# Patient Record
Sex: FEMALE | Race: WHITE | NOT HISPANIC OR LATINO
[De-identification: names, ages, dates, MRNs, and addresses within clinical notes are randomized per-mention and may not be internally consistent; named-entity substitution may affect disease eponyms.]

---

## 2017-03-28 ENCOUNTER — APPOINTMENT (OUTPATIENT)
Dept: RHEUMATOLOGY | Facility: CLINIC | Age: 67
End: 2017-03-28

## 2017-07-03 ENCOUNTER — OUTPATIENT (OUTPATIENT)
Dept: OUTPATIENT SERVICES | Facility: HOSPITAL | Age: 67
LOS: 1 days | Discharge: HOME | End: 2017-07-03
Payer: MEDICARE

## 2017-07-03 ENCOUNTER — RESULT REVIEW (OUTPATIENT)
Age: 67
End: 2017-07-03

## 2017-07-03 VITALS
SYSTOLIC BLOOD PRESSURE: 135 MMHG | HEART RATE: 67 BPM | OXYGEN SATURATION: 100 % | RESPIRATION RATE: 16 BRPM | WEIGHT: 117.95 LBS | HEIGHT: 63 IN | TEMPERATURE: 98 F | DIASTOLIC BLOOD PRESSURE: 63 MMHG

## 2017-07-03 DIAGNOSIS — Z98.890 OTHER SPECIFIED POSTPROCEDURAL STATES: Chronic | ICD-10-CM

## 2017-07-03 PROCEDURE — 88305 TISSUE EXAM BY PATHOLOGIST: CPT | Mod: 26

## 2017-07-03 PROCEDURE — 88312 SPECIAL STAINS GROUP 1: CPT | Mod: 26

## 2017-07-03 RX ORDER — SODIUM CHLORIDE 9 MG/ML
1000 INJECTION INTRAMUSCULAR; INTRAVENOUS; SUBCUTANEOUS
Qty: 0 | Refills: 0 | Status: DISCONTINUED | OUTPATIENT
Start: 2017-07-03 | End: 2017-07-18

## 2017-07-03 RX ORDER — AMITRIPTYLINE HCL 25 MG
1 TABLET ORAL
Qty: 0 | Refills: 0 | COMMUNITY

## 2017-07-03 RX ADMIN — SODIUM CHLORIDE 75 MILLILITER(S): 9 INJECTION INTRAMUSCULAR; INTRAVENOUS; SUBCUTANEOUS at 10:40

## 2017-07-03 NOTE — ASU PATIENT PROFILE, ADULT - PSH
H/O carpal tunnel repair    H/O hernia repair    S/P right knee arthroscopy H/O carpal tunnel repair    H/O hernia repair    History of bunionectomy of left great toe  2013  S/P bunionectomy  right foot 1993  S/P right knee arthroscopy

## 2017-07-06 LAB — SURGICAL PATHOLOGY FINAL REPORT - CH: SIGNIFICANT CHANGE UP

## 2017-07-12 DIAGNOSIS — K44.9 DIAPHRAGMATIC HERNIA WITHOUT OBSTRUCTION OR GANGRENE: ICD-10-CM

## 2017-07-12 DIAGNOSIS — Z88.0 ALLERGY STATUS TO PENICILLIN: ICD-10-CM

## 2017-07-12 DIAGNOSIS — K31.7 POLYP OF STOMACH AND DUODENUM: ICD-10-CM

## 2017-07-12 DIAGNOSIS — I34.1 NONRHEUMATIC MITRAL (VALVE) PROLAPSE: ICD-10-CM

## 2017-07-12 DIAGNOSIS — E78.5 HYPERLIPIDEMIA, UNSPECIFIED: ICD-10-CM

## 2017-11-03 ENCOUNTER — APPOINTMENT (OUTPATIENT)
Dept: OBGYN | Facility: CLINIC | Age: 67
End: 2017-11-03

## 2018-10-26 PROBLEM — E04.9 NONTOXIC GOITER, UNSPECIFIED: Chronic | Status: ACTIVE | Noted: 2017-07-03

## 2018-10-26 PROBLEM — E78.5 HYPERLIPIDEMIA, UNSPECIFIED: Chronic | Status: ACTIVE | Noted: 2017-07-03

## 2018-10-26 PROBLEM — I34.1 NONRHEUMATIC MITRAL (VALVE) PROLAPSE: Chronic | Status: ACTIVE | Noted: 2017-07-03

## 2018-10-26 PROBLEM — K21.9 GASTRO-ESOPHAGEAL REFLUX DISEASE WITHOUT ESOPHAGITIS: Chronic | Status: ACTIVE | Noted: 2017-07-03

## 2018-12-04 ENCOUNTER — APPOINTMENT (OUTPATIENT)
Dept: OBGYN | Facility: CLINIC | Age: 68
End: 2018-12-04
Payer: MEDICARE

## 2018-12-04 VITALS
WEIGHT: 114 LBS | HEIGHT: 63 IN | BODY MASS INDEX: 20.2 KG/M2 | DIASTOLIC BLOOD PRESSURE: 60 MMHG | SYSTOLIC BLOOD PRESSURE: 110 MMHG

## 2018-12-04 PROCEDURE — 99213 OFFICE O/P EST LOW 20 MIN: CPT

## 2018-12-10 ENCOUNTER — APPOINTMENT (OUTPATIENT)
Dept: DERMATOLOGY | Facility: CLINIC | Age: 68
End: 2018-12-10
Payer: MEDICARE

## 2018-12-10 VITALS — BODY MASS INDEX: 21.09 KG/M2 | HEIGHT: 63 IN | WEIGHT: 119 LBS

## 2018-12-10 DIAGNOSIS — Z80.8 FAMILY HISTORY OF MALIGNANT NEOPLASM OF OTHER ORGANS OR SYSTEMS: ICD-10-CM

## 2018-12-10 DIAGNOSIS — Z84.0 FAMILY HISTORY OF DISEASES OF THE SKIN AND SUBCUTANEOUS TISSUE: ICD-10-CM

## 2018-12-10 DIAGNOSIS — Z86.39 PERSONAL HISTORY OF OTHER ENDOCRINE, NUTRITIONAL AND METABOLIC DISEASE: ICD-10-CM

## 2018-12-10 PROCEDURE — 99203 OFFICE O/P NEW LOW 30 MIN: CPT | Mod: 25

## 2018-12-10 PROCEDURE — 11901 INJECT SKIN LESIONS >7: CPT

## 2018-12-10 RX ORDER — HYDROCORTISONE BUTYRATE 1 MG/ML
0.1 SOLUTION TOPICAL TWICE DAILY
Qty: 1 | Refills: 2 | Status: ACTIVE | COMMUNITY
Start: 2018-12-10 | End: 1900-01-01

## 2018-12-10 RX ORDER — BIOTIN 5 MG
5 CAPSULE ORAL
Refills: 0 | Status: ACTIVE | COMMUNITY

## 2019-01-18 ENCOUNTER — APPOINTMENT (OUTPATIENT)
Dept: OBGYN | Facility: CLINIC | Age: 69
End: 2019-01-18
Payer: MEDICARE

## 2019-01-18 VITALS
WEIGHT: 123 LBS | HEIGHT: 63 IN | SYSTOLIC BLOOD PRESSURE: 110 MMHG | DIASTOLIC BLOOD PRESSURE: 60 MMHG | BODY MASS INDEX: 21.79 KG/M2

## 2019-01-18 DIAGNOSIS — I86.3 VULVAL VARICES: ICD-10-CM

## 2019-01-18 DIAGNOSIS — R35.0 FREQUENCY OF MICTURITION: ICD-10-CM

## 2019-01-18 DIAGNOSIS — L29.2 PRURITUS VULVAE: ICD-10-CM

## 2019-01-18 PROCEDURE — 82270 OCCULT BLOOD FECES: CPT

## 2019-01-18 PROCEDURE — G0101: CPT

## 2019-01-18 RX ORDER — HYDROCORTISONE 25 MG/G
2.5 CREAM TOPICAL DAILY
Qty: 1 | Refills: 0 | Status: ACTIVE | COMMUNITY
Start: 2019-01-18 | End: 1900-01-01

## 2019-01-23 ENCOUNTER — APPOINTMENT (OUTPATIENT)
Dept: DERMATOLOGY | Facility: CLINIC | Age: 69
End: 2019-01-23
Payer: MEDICARE

## 2019-01-23 DIAGNOSIS — E04.2 NONTOXIC MULTINODULAR GOITER: ICD-10-CM

## 2019-01-23 DIAGNOSIS — Z92.89 PERSONAL HISTORY OF OTHER MEDICAL TREATMENT: ICD-10-CM

## 2019-01-23 DIAGNOSIS — F41.9 ANXIETY DISORDER, UNSPECIFIED: ICD-10-CM

## 2019-01-23 DIAGNOSIS — R01.1 CARDIAC MURMUR, UNSPECIFIED: ICD-10-CM

## 2019-01-23 DIAGNOSIS — E78.00 PURE HYPERCHOLESTEROLEMIA, UNSPECIFIED: ICD-10-CM

## 2019-01-23 LAB — CYTOLOGY CVX/VAG DOC THIN PREP: NORMAL

## 2019-01-23 PROCEDURE — 99213 OFFICE O/P EST LOW 20 MIN: CPT | Mod: 25

## 2019-01-23 PROCEDURE — 11901 INJECT SKIN LESIONS >7: CPT

## 2019-01-23 NOTE — PHYSICAL EXAM
[Alert] : alert [Oriented x 3] : ~L oriented x 3 [Well Nourished] : well nourished [FreeTextEntry3] : Marked alopecia of the sideburn regions, trace of the frontal hairline in the temporal region.

## 2019-01-23 NOTE — HISTORY OF PRESENT ILLNESS
[FreeTextEntry1] : Hair loss persists. 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  No improvement to date.  using locoid solution and minoxidil/finasteride.  Denies itching or irritation.

## 2019-01-23 NOTE — ASSESSMENT
[FreeTextEntry1] : Continue topicals as doing.\par discussed at length with patient.\par f/u in 1 month.

## 2019-01-27 NOTE — PHYSICAL EXAM
[Awake] : awake [Alert] : alert [Acute Distress] : no acute distress [Mass] : no breast mass [Nipple Discharge] : no nipple discharge [Axillary LAD] : no axillary lymphadenopathy [Soft] : soft [Tender] : non tender [Oriented x3] : oriented to person, place, and time [No Bleeding] : there was no active vaginal bleeding [Uterine Adnexae] : were not tender and not enlarged [Occult Blood] : occult blood test from digital rectal exam was negative [Normal rectal exam] : was normal [Normal Brown Stool] : was normal and brown [Internal Hemorrhoid] : no internal hemorrhoids were present [External Hemorrhoid] : no external hemorrhoids were present [Skin Tags] : no residual hemorrhoidal skin tags [Normal] : was normal [None] : there was no rectal mass

## 2019-01-27 NOTE — CHIEF COMPLAINT
[FreeTextEntry1] : Patient presents for annual GYN examination.  Patient notes urinary frequency and vulvar itching

## 2019-02-13 ENCOUNTER — RESULT REVIEW (OUTPATIENT)
Age: 69
End: 2019-02-13

## 2019-02-20 ENCOUNTER — APPOINTMENT (OUTPATIENT)
Dept: DERMATOLOGY | Facility: CLINIC | Age: 69
End: 2019-02-20
Payer: MEDICARE

## 2019-02-20 VITALS — BODY MASS INDEX: 21.79 KG/M2 | HEIGHT: 63 IN | WEIGHT: 123 LBS

## 2019-02-20 PROCEDURE — 99213 OFFICE O/P EST LOW 20 MIN: CPT | Mod: 25

## 2019-02-20 PROCEDURE — 11901 INJECT SKIN LESIONS >7: CPT

## 2019-02-20 NOTE — HISTORY OF PRESENT ILLNESS
[FreeTextEntry1] : Alopecia. [de-identified] : AA (sideburn region) and frontal fibrosing alopecia, and likely early androgenetic alopecia.

## 2019-02-20 NOTE — PHYSICAL EXAM
[Alert] : alert [Oriented x 3] : ~L oriented x 3 [Well Nourished] : well nourished [Nose] : Nose [Eyelids] : Eyelids [Ears] : Ears [Lips] : Lips [Neck] : Neck [FreeTextEntry3] : No significant change to date.

## 2019-02-20 NOTE — ASSESSMENT
[FreeTextEntry1] : Alopecia\par continue locoid solution and minoxidil/finasteride compound.\par Education.\par f/u in 1 month.

## 2019-03-20 ENCOUNTER — APPOINTMENT (OUTPATIENT)
Dept: DERMATOLOGY | Facility: CLINIC | Age: 69
End: 2019-03-20
Payer: MEDICARE

## 2019-03-20 DIAGNOSIS — L63.9 ALOPECIA AREATA, UNSPECIFIED: ICD-10-CM

## 2019-03-20 PROCEDURE — 99213 OFFICE O/P EST LOW 20 MIN: CPT

## 2019-03-20 PROCEDURE — 0019D: CPT

## 2019-03-20 NOTE — HISTORY OF PRESENT ILLNESS
[FreeTextEntry1] : Alopecia. [de-identified] : Patient feels no progress of the sideburn region.  No itching, irritation, stinging or tenderness.

## 2019-03-20 NOTE — PHYSICAL EXAM
[Alert] : alert [Oriented x 3] : ~L oriented x 3 [Well Nourished] : well nourished [Eyelids] : Eyelids [Ears] : Ears [Lips] : Lips [FreeTextEntry3] : Scarred alopecia - right > left sideburn region.\par Frontal hair line doing well.

## 2019-04-01 ENCOUNTER — APPOINTMENT (OUTPATIENT)
Dept: NEUROLOGY | Facility: CLINIC | Age: 69
End: 2019-04-01
Payer: MEDICARE

## 2019-04-01 VITALS
HEART RATE: 67 BPM | DIASTOLIC BLOOD PRESSURE: 58 MMHG | SYSTOLIC BLOOD PRESSURE: 102 MMHG | BODY MASS INDEX: 22.15 KG/M2 | WEIGHT: 125 LBS | HEIGHT: 63 IN

## 2019-04-01 DIAGNOSIS — S09.90XA UNSPECIFIED INJURY OF HEAD, INITIAL ENCOUNTER: ICD-10-CM

## 2019-04-01 DIAGNOSIS — H53.2 DIPLOPIA: ICD-10-CM

## 2019-04-01 PROCEDURE — 99214 OFFICE O/P EST MOD 30 MIN: CPT

## 2019-04-01 RX ORDER — NALTREXONE HYDROCHLORIDE 50 MG/1
TABLET, FILM COATED ORAL
Refills: 0 | Status: DISCONTINUED | COMMUNITY
End: 2019-04-01

## 2019-04-01 RX ORDER — VALACYCLOVIR HYDROCHLORIDE 1 G/1
TABLET, FILM COATED ORAL
Refills: 0 | Status: ACTIVE | COMMUNITY

## 2019-04-01 NOTE — PHYSICAL EXAM
[General Appearance - In No Acute Distress] : in no acute distress [General Appearance - Well-Appearing] : healthy appearing [Person] : oriented to person [Place] : oriented to place [Time] : oriented to time [Short Term Intact] : short term memory intact [Remote Intact] : remote memory intact [Registration Intact] : recent registration memory intact [Span Intact] : the attention span was normal [Concentration Intact] : normal concentrating ability [Visual Intact] : visual attention was ~T not ~L decreased [Naming Objects] : no difficulty naming common objects [Repeating Phrases] : no difficulty repeating a phrase [Writing A Sentence] : no difficulty writing a sentence [Fluency] : fluency intact [Comprehension] : comprehension intact [Reading] : reading intact [Current Events] : adequate knowledge of current events [Past History] : adequate knowledge of personal past history [Vocabulary] : adequate range of vocabulary [Cranial Nerves Optic (II)] : visual acuity intact bilaterally,  visual fields full to confrontation, pupils equal round and reactive to light [Cranial Nerves Oculomotor (III)] : extraocular motion intact [Cranial Nerves Trigeminal (V)] : facial sensation intact symmetrically [Cranial Nerves Facial (VII)] : face symmetrical [Cranial Nerves Vestibulocochlear (VIII)] : hearing was intact bilaterally [Cranial Nerves Glossopharyngeal (IX)] : tongue and palate midline [Cranial Nerves Accessory (XI - Cranial And Spinal)] : head turning and shoulder shrug symmetric [Cranial Nerves Hypoglossal (XII)] : there was no tongue deviation with protrusion [Motor Tone] : muscle tone was normal in all four extremities [Motor Strength] : muscle strength was normal in all four extremities [No Muscle Atrophy] : normal bulk in all four extremities [Sensation Tactile Decrease] : light touch was intact [Sensation Pain / Temperature Decrease] : pain and temperature was intact [Sensation Vibration Decrease] : vibration was intact [Proprioception] : proprioception was intact [Balance] : balance was intact [2+] : Ankle jerk left 2+ [Auscultation Breath Sounds / Voice Sounds] : lungs were clear to auscultation bilaterally [Arterial Pulses Carotid] : carotid pulses were normal with no bruits [Edema] : there was no peripheral edema [No Spinal Tenderness] : no spinal tenderness [Abnormal Walk] : normal gait [] : no rash [Past-pointing] : there was no past-pointing [Tremor] : no tremor present [Plantar Reflex Right Only] : normal on the right [Plantar Reflex Left Only] : normal on the left [FreeTextEntry5] : fleeting diplopia elicited in the levoversion and levo-elevation [FreeTextEntry1] : scalp and suboccipital nodule ? lipoma/dermoid

## 2019-04-01 NOTE — DISCUSSION/SUMMARY
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 Ramirez and physical therapy as needed.\par \par

## 2019-04-01 NOTE — DATA REVIEWED
[FreeTextEntry1] : MRI cervical spine: 6/5/14; degenerative changes including bulge and small central herniation with slight cord flattening impingement at C4-5 as well as slight right-sided cord flattening at C5-6 due to right-sided disc osteophyte.\par MRI lumbar spine: 5/31/14; mild disc bulging, L4-5 and L5-S1 present on prior exam dated 11/1/08. Degenerative disc space narrowing, mildly worsened.\par MRI chest unremarkable.\par Labs: 7/28/14; , vitamin D-50.9, TSH-0.77. CBC, lipid profile normal\par \par MRI brain: 4/11/10; unremarkable.\par MRI LS spine: 12/8/11; L2-3/3 for bulges, L4-5/L5-S1 bulges with neutral foramina narrowing\par EMG/MCV studies: 10/27/11; abnormal study. 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 There is no evidence of peripheral sensorimotor polyneuropathy.\par SFEMG; 12/3/10; unremarkable. RNST: no decremental response noted.

## 2019-04-01 NOTE — HISTORY OF PRESENT ILLNESS
[FreeTextEntry1] : Patient is here for a follow up visit today, was last seen on 10/4/16. Patient has remained stable in regard to diplopia, has occasional diplopia while looking to the left side, most of the time it occurs when she is fatigued. It is not associated visual blurring eyelid droop or headaches.\par \par Patient reports that 5 weeks ago she was walking down the stairs at home, she slipped on the drug, sustained injury to left sided chest and back of the neck/head, no LOC, she had a large scalp hematoma that resolved with ice, was seen by PMD, noted to have rib fractures that have healed. Patient continued to have suboccipital / neck pain for a few weeks, she reports another small trauma back of the head when she was sitting on a chair during computer work, she has slight scalp soreness since.\par \par And has followed up with dermatologist for small area of alopecia and some scalp cysts

## 2019-04-01 NOTE — REVIEW OF SYSTEMS
[Numbness] : numbness [Tingling] : tingling [Sleep Disturbances] : sleep disturbances [Anxiety] : anxiety [Loss Of Hearing] : hearing loss [Negative] : Endocrine [As Noted in HPI] : as noted in HPI [Confused or Disoriented] : no confusion [Memory Lapses or Loss] : no memory loss [Decr. Concentrating Ability] : no decrease in concentrating ability [Difficulty with Language] : no ~M difficulty with language [Changed Thought Patterns] : no change in thought patterns [Repeating Questions] : no repeated questioning about recent events [Facial Weakness] : no facial weakness [Arm Weakness] : no arm weakness [Hand Weakness] : no hand weakness [Leg Weakness] : no leg weakness [Poor Coordination] : good coordination [Difficulty Writing] : no difficulty writing [Fainting] : no fainting [Lightheadedness] : no lightheadedness [Tension Headache] : no tension-type headache [Difficulty Walking] : no difficulty walking [Eyesight Problems] : no eyesight problems [Palpitations] : no palpitations [Shortness Of Breath] : no shortness of breath [Vomiting] : no vomiting [Heartburn] : no heartburn [Incontinence] : no incontinence [Skin Lesions] : no skin lesions [Muscle Weakness] : no muscle weakness [Easy Bleeding] : no tendency for easy bleeding [de-identified] : head trauma [FreeTextEntry3] : Diplopia [FreeTextEntry9] : Neck pain [de-identified] : alopecia areata

## 2019-04-16 ENCOUNTER — FORM ENCOUNTER (OUTPATIENT)
Age: 69
End: 2019-04-16

## 2019-04-17 ENCOUNTER — OUTPATIENT (OUTPATIENT)
Dept: OUTPATIENT SERVICES | Facility: HOSPITAL | Age: 69
LOS: 1 days | End: 2019-04-17
Payer: MEDICARE

## 2019-04-17 ENCOUNTER — APPOINTMENT (OUTPATIENT)
Dept: CT IMAGING | Facility: CLINIC | Age: 69
End: 2019-04-17
Payer: MEDICARE

## 2019-04-17 DIAGNOSIS — Z98.890 OTHER SPECIFIED POSTPROCEDURAL STATES: Chronic | ICD-10-CM

## 2019-04-17 DIAGNOSIS — Z00.8 ENCOUNTER FOR OTHER GENERAL EXAMINATION: ICD-10-CM

## 2019-04-17 PROCEDURE — 70450 CT HEAD/BRAIN W/O DYE: CPT | Mod: 26

## 2019-04-17 PROCEDURE — 70450 CT HEAD/BRAIN W/O DYE: CPT

## 2019-06-24 ENCOUNTER — APPOINTMENT (OUTPATIENT)
Dept: DERMATOLOGY | Facility: CLINIC | Age: 69
End: 2019-06-24
Payer: MEDICARE

## 2019-06-24 DIAGNOSIS — L64.9 ANDROGENIC ALOPECIA, UNSPECIFIED: ICD-10-CM

## 2019-06-24 PROCEDURE — 99213 OFFICE O/P EST LOW 20 MIN: CPT

## 2019-06-24 NOTE — ASSESSMENT
[FreeTextEntry1] : Frontal fibrosing alopecia\par Androgenetic alopecia\par \par Education.\par API solution for men bid.\par Locoid solution bid.\par f/u in 4 months.

## 2019-06-24 NOTE — PHYSICAL EXAM
[Alert] : alert [Oriented x 3] : ~L oriented x 3 [Well Nourished] : well nourished [FreeTextEntry3] : Alopecia of the bilateral temporal (sideburn) regions.  Some regions with fine vellus hairs.\par \par Androgenetic alopecia on scalp.

## 2019-06-24 NOTE — HISTORY OF PRESENT ILLNESS
[FreeTextEntry1] : Check alopecia. [de-identified] : Patient for evaluation of bilateral temporal alopecia.  ? progression over the past 3 months.  No bleeding or irritation or itching.

## 2019-10-30 ENCOUNTER — APPOINTMENT (OUTPATIENT)
Dept: DERMATOLOGY | Facility: CLINIC | Age: 69
End: 2019-10-30
Payer: MEDICARE

## 2019-10-30 DIAGNOSIS — D48.9 NEOPLASM OF UNCERTAIN BEHAVIOR, UNSPECIFIED: ICD-10-CM

## 2019-10-30 PROCEDURE — 11102 TANGNTL BX SKIN SINGLE LES: CPT

## 2019-10-30 PROCEDURE — 99213 OFFICE O/P EST LOW 20 MIN: CPT | Mod: 25

## 2019-10-30 NOTE — PHYSICAL EXAM
[Alert] : alert [Oriented x 3] : ~L oriented x 3 [Well Nourished] : well nourished [FreeTextEntry3] : Alopecia of the bilateral temporal (sideburn) regions.  Some regions with fine vellus hairs. + follicle dropout;  R > L sideburn;  no inflammation;\par \par Hyperkeratotic firm papule with scale; Left shin

## 2019-10-30 NOTE — HISTORY OF PRESENT ILLNESS
[de-identified] : Recheck of scalp;  using API/ locoid solutions;;  no progression noted;\par also:  growing lesion on left leg, concerned with wart;

## 2019-10-30 NOTE — ASSESSMENT
[FreeTextEntry1] : Frontal fibrosing alopecia\par appears stable;  continue tx- API solution for men bid.; Locoid solution bid.\par Photos taken to monitor progress\par f/u in 4 months.\par \par Prob traumatized SK L anguiano\par The patient was instructed to check portal and/or call the office in one week for biopsy results. Plan to treat by D&C if the biopsy is positive.

## 2019-10-31 ENCOUNTER — APPOINTMENT (OUTPATIENT)
Dept: DERMATOLOGY | Facility: CLINIC | Age: 69
End: 2019-10-31

## 2019-11-18 LAB — CORE LAB BIOPSY: NORMAL

## 2020-01-13 ENCOUNTER — APPOINTMENT (OUTPATIENT)
Dept: NEUROLOGY | Facility: CLINIC | Age: 70
End: 2020-01-13

## 2020-02-26 ENCOUNTER — APPOINTMENT (OUTPATIENT)
Dept: DERMATOLOGY | Facility: CLINIC | Age: 70
End: 2020-02-26

## 2020-02-28 ENCOUNTER — APPOINTMENT (OUTPATIENT)
Dept: CT IMAGING | Facility: CLINIC | Age: 70
End: 2020-02-28
Payer: MEDICARE

## 2020-02-28 ENCOUNTER — OUTPATIENT (OUTPATIENT)
Dept: OUTPATIENT SERVICES | Facility: HOSPITAL | Age: 70
LOS: 1 days | End: 2020-02-28
Payer: MEDICARE

## 2020-02-28 DIAGNOSIS — Z98.890 OTHER SPECIFIED POSTPROCEDURAL STATES: Chronic | ICD-10-CM

## 2020-02-28 DIAGNOSIS — Z00.8 ENCOUNTER FOR OTHER GENERAL EXAMINATION: ICD-10-CM

## 2020-02-28 PROCEDURE — 82565 ASSAY OF CREATININE: CPT

## 2020-02-28 PROCEDURE — 74178 CT ABD&PLV WO CNTR FLWD CNTR: CPT

## 2020-02-28 PROCEDURE — 74178 CT ABD&PLV WO CNTR FLWD CNTR: CPT | Mod: 26

## 2020-05-08 ENCOUNTER — APPOINTMENT (OUTPATIENT)
Dept: DERMATOLOGY | Facility: CLINIC | Age: 70
End: 2020-05-08
Payer: SELF-PAY

## 2020-05-08 PROCEDURE — 0019D: CPT

## 2020-10-21 DIAGNOSIS — Z01.818 ENCOUNTER FOR OTHER PREPROCEDURAL EXAMINATION: ICD-10-CM

## 2020-10-23 ENCOUNTER — APPOINTMENT (OUTPATIENT)
Dept: DISASTER EMERGENCY | Facility: CLINIC | Age: 70
End: 2020-10-23

## 2020-10-24 LAB — SARS-COV-2 N GENE NPH QL NAA+PROBE: NOT DETECTED

## 2020-10-26 ENCOUNTER — OUTPATIENT (OUTPATIENT)
Dept: OUTPATIENT SERVICES | Facility: HOSPITAL | Age: 70
LOS: 1 days | Discharge: HOME | End: 2020-10-26

## 2020-10-26 VITALS
DIASTOLIC BLOOD PRESSURE: 63 MMHG | OXYGEN SATURATION: 100 % | TEMPERATURE: 98 F | HEART RATE: 65 BPM | RESPIRATION RATE: 25 BRPM | HEIGHT: 63 IN | SYSTOLIC BLOOD PRESSURE: 139 MMHG | WEIGHT: 123.02 LBS

## 2020-10-26 DIAGNOSIS — Z98.890 OTHER SPECIFIED POSTPROCEDURAL STATES: Chronic | ICD-10-CM

## 2020-10-26 DIAGNOSIS — K59.01 SLOW TRANSIT CONSTIPATION: ICD-10-CM

## 2020-10-26 RX ORDER — CHOLECALCIFEROL (VITAMIN D3) 125 MCG
0 CAPSULE ORAL
Qty: 0 | Refills: 0 | DISCHARGE

## 2020-10-26 RX ORDER — VALACYCLOVIR 500 MG/1
1 TABLET, FILM COATED ORAL
Qty: 0 | Refills: 0 | DISCHARGE

## 2020-10-26 RX ORDER — MELOXICAM 15 MG/1
1 TABLET ORAL
Qty: 0 | Refills: 0 | DISCHARGE

## 2020-10-26 RX ORDER — ATORVASTATIN CALCIUM 80 MG/1
1 TABLET, FILM COATED ORAL
Qty: 0 | Refills: 0 | DISCHARGE

## 2020-10-26 RX ORDER — ALPRAZOLAM 0.25 MG
0 TABLET ORAL
Qty: 0 | Refills: 0 | DISCHARGE

## 2020-10-26 RX ORDER — SERTRALINE 25 MG/1
1 TABLET, FILM COATED ORAL
Qty: 0 | Refills: 0 | DISCHARGE

## 2020-10-26 RX ORDER — METOPROLOL TARTRATE 50 MG
1 TABLET ORAL
Qty: 0 | Refills: 0 | DISCHARGE

## 2020-10-26 NOTE — ASU PATIENT PROFILE, ADULT - PSH
H/O carpal tunnel repair    H/O hernia repair    History of bunionectomy of left great toe  2013  S/P bunionectomy  right foot 1993  S/P right knee arthroscopy

## 2020-10-26 NOTE — ASU PATIENT PROFILE, ADULT - PMH
Alopecia    GERD (gastroesophageal reflux disease)    Goiter    Hyperlipemia    Mitral valve prolapse

## 2020-10-27 ENCOUNTER — APPOINTMENT (OUTPATIENT)
Dept: DERMATOLOGY | Facility: CLINIC | Age: 70
End: 2020-10-27
Payer: MEDICARE

## 2020-10-27 VITALS — HEIGHT: 63 IN | BODY MASS INDEX: 22.15 KG/M2 | WEIGHT: 125 LBS

## 2020-10-27 DIAGNOSIS — L66.1 LICHEN PLANOPILARIS: ICD-10-CM

## 2020-10-27 PROBLEM — L65.9 NONSCARRING HAIR LOSS, UNSPECIFIED: Chronic | Status: ACTIVE | Noted: 2020-10-26

## 2020-10-27 PROCEDURE — 99213 OFFICE O/P EST LOW 20 MIN: CPT

## 2020-10-27 NOTE — HISTORY OF PRESENT ILLNESS
[de-identified] : f/u for check of frontal fibrosing alopecia;  ? progression since last year;  no txs currently

## 2020-10-27 NOTE — ASSESSMENT
[FreeTextEntry1] : FFA:  Therapeutic options and their risks and benefits; along with multiple diagnostic possibilities were discussed at length;\par \par ILK to frontotemporal areas;  to prevent progression\par may resume API solution\par recheck 2-3 mos

## 2020-10-27 NOTE — PHYSICAL EXAM
[FreeTextEntry3] : scalp;  temple/sideburn areas unchanged, ? some progression superiorly along hairline when compared with photos from 2019

## 2020-10-30 DIAGNOSIS — K21.9 GASTRO-ESOPHAGEAL REFLUX DISEASE WITHOUT ESOPHAGITIS: ICD-10-CM

## 2020-10-30 DIAGNOSIS — E78.5 HYPERLIPIDEMIA, UNSPECIFIED: ICD-10-CM

## 2020-10-30 DIAGNOSIS — Z88.0 ALLERGY STATUS TO PENICILLIN: ICD-10-CM

## 2020-10-30 DIAGNOSIS — K64.8 OTHER HEMORRHOIDS: ICD-10-CM

## 2020-10-30 DIAGNOSIS — Z88.1 ALLERGY STATUS TO OTHER ANTIBIOTIC AGENTS STATUS: ICD-10-CM

## 2020-10-30 DIAGNOSIS — K59.00 CONSTIPATION, UNSPECIFIED: ICD-10-CM

## 2021-01-08 ENCOUNTER — APPOINTMENT (OUTPATIENT)
Dept: DERMATOLOGY | Facility: CLINIC | Age: 71
End: 2021-01-08
Payer: MEDICARE

## 2021-01-08 VITALS — HEIGHT: 63 IN | WEIGHT: 128 LBS | BODY MASS INDEX: 22.68 KG/M2

## 2021-01-08 PROCEDURE — 99214 OFFICE O/P EST MOD 30 MIN: CPT

## 2021-01-08 RX ORDER — ESCITALOPRAM OXALATE 10 MG/1
10 TABLET, FILM COATED ORAL
Refills: 0 | Status: DISCONTINUED | COMMUNITY
End: 2021-01-08

## 2021-01-08 NOTE — HISTORY OF PRESENT ILLNESS
[de-identified] : f/u for check of frontal fibrosing alopecia;   progression since last year;  some improvement after prior ILK, still c/o persistent loss at temples

## 2021-01-08 NOTE — ASSESSMENT
[FreeTextEntry1] : FFA:  Therapeutic options and their risks and benefits; along with multiple diagnostic possibilities were discussed at length;\par \par some improvement, but still active  new photos taken of affected areas;\par Add doxy 100 BID;  meds reviewed;  Risks and benefits of doxycycline were discussed including GI upset, photosensitivity; \par \par may continue API solution\par recheck 6-8 weeks

## 2021-01-08 NOTE — PHYSICAL EXAM
[FreeTextEntry3] : scalp;  temple/sideburn areas unchanged, ? some progression superiorly along hairline when compared with photos from 2019\par \par + regrowth on R > L;  + one area of follicle drop out on L frontotemporal area;\par \par

## 2021-03-22 ENCOUNTER — APPOINTMENT (OUTPATIENT)
Dept: NEUROLOGY | Facility: CLINIC | Age: 71
End: 2021-03-22
Payer: MEDICARE

## 2021-03-22 VITALS
DIASTOLIC BLOOD PRESSURE: 60 MMHG | HEART RATE: 67 BPM | HEIGHT: 63 IN | TEMPERATURE: 97.9 F | WEIGHT: 129 LBS | SYSTOLIC BLOOD PRESSURE: 110 MMHG | BODY MASS INDEX: 22.86 KG/M2

## 2021-03-22 DIAGNOSIS — R26.81 UNSTEADINESS ON FEET: ICD-10-CM

## 2021-03-22 PROCEDURE — 99214 OFFICE O/P EST MOD 30 MIN: CPT

## 2021-03-22 NOTE — REVIEW OF SYSTEMS
[Numbness] : numbness [Tingling] : tingling [Sleep Disturbances] : sleep disturbances [Anxiety] : anxiety [Loss Of Hearing] : hearing loss [Poor Coordination] : poor coordination [As Noted in HPI] : as noted in HPI [Negative] : Heme/Lymph [Abnormal Sensation] : an abnormal sensation [FreeTextEntry3] : Diplopia [FreeTextEntry9] : Neck pain

## 2021-03-22 NOTE — PHYSICAL EXAM
[General Appearance - In No Acute Distress] : in no acute distress [General Appearance - Well-Appearing] : healthy appearing [Person] : oriented to person [Place] : oriented to place [Time] : oriented to time [Short Term Intact] : short term memory intact [Remote Intact] : remote memory intact [Registration Intact] : recent registration memory intact [Span Intact] : the attention span was normal [Concentration Intact] : normal concentrating ability [Naming Objects] : no difficulty naming common objects [Repeating Phrases] : no difficulty repeating a phrase [Fluency] : fluency intact [Comprehension] : comprehension intact [Current Events] : adequate knowledge of current events [Past History] : adequate knowledge of personal past history [Cranial Nerves Optic (II)] : visual acuity intact bilaterally,  visual fields full to confrontation, pupils equal round and reactive to light [Cranial Nerves Oculomotor (III)] : extraocular motion intact [Cranial Nerves Trigeminal (V)] : facial sensation intact symmetrically [Cranial Nerves Facial (VII)] : face symmetrical [Cranial Nerves Vestibulocochlear (VIII)] : hearing was intact bilaterally [Cranial Nerves Glossopharyngeal (IX)] : tongue and palate midline [Cranial Nerves Accessory (XI - Cranial And Spinal)] : head turning and shoulder shrug symmetric [Cranial Nerves Hypoglossal (XII)] : there was no tongue deviation with protrusion [Motor Tone] : muscle tone was normal in all four extremities [Motor Strength] : muscle strength was normal in all four extremities [No Muscle Atrophy] : normal bulk in all four extremities [Sensation Tactile Decrease] : light touch was intact [Balance] : balance was intact [2+] : Ankle jerk left 2+ [Auscultation Breath Sounds / Voice Sounds] : lungs were clear to auscultation bilaterally [Arterial Pulses Carotid] : carotid pulses were normal with no bruits [Edema] : there was no peripheral edema [No Spinal Tenderness] : no spinal tenderness [Abnormal Walk] : normal gait [] : no rash [Affect] : the affect was normal [Mood] : the mood was normal [PERRL With Normal Accommodation] : pupils were equal in size, round, reactive to light, with normal accommodation [Extraocular Movements] : extraocular movements were intact [Full Visual Field] : full visual field [Outer Ear] : the ears and nose were normal in appearance [Hearing Threshold Finger Rub Not Jewell] : hearing was normal [Past-pointing] : there was no past-pointing [Tremor] : no tremor present [Plantar Reflex Right Only] : normal on the right [Plantar Reflex Left Only] : normal on the left [FreeTextEntry5] : fleeting diplopia elicited in the levoversion and levo-elevation [FreeTextEntry1] : scalp and suboccipital nodule ? lipoma/dermoid

## 2021-03-22 NOTE — DISCUSSION/SUMMARY
[FreeTextEntry1] : #1) Scalp dysesthesias\par \par - Labs: ESR / CRP ( pt. 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

## 2021-03-22 NOTE — HISTORY OF PRESENT ILLNESS
[FreeTextEntry1] : Patient is here for a follow up visit today, last seen on 4/1/19. Patient c/o occasional soreness of scalp, denies jaw claudication but admits to TMJ like pain at times, she denies visual blurring but has diplopia it is chronic while looking to the left side, most of the time it occurs when she is fatigued. It is not associated with eyelid droop or headaches.\par \par Patient reports occasional gait instability - like while turning fast, has had no falls\par \par Patient had CT scan of the head done after last visit in April 2019, was unremarkable\par \par

## 2021-03-22 NOTE — DATA REVIEWED
[de-identified] : MRI brain: 4/11/10; unremarkable. [de-identified] : 4/17/19:  CT scan head is normal. \par MRI cervical spine: 6/5/14; degenerative changes including bulge and small central herniation with slight cord flattening impingement at C4-5 as well as slight right-sided cord flattening at C5-6 due to right-sided disc osteophyte.\par MRI lumbar spine: 5/31/14; mild disc bulging, L4-5 and L5-S1 present on prior exam 11/1/08. Degenerative disc space narrowing, mildly worsened.\par MRI chest unremarkable.\par Labs: 7/28/14; , vitamin D-50.9, TSH-0.77. CBC, lipid profile normal\par MRI LS spine: 12/8/11; L2-3/3 for bulges, L4-5/L5-S1 bulges with neutral foramina narrowing\par EMG/MCV studies: 10/27/11; abnormal study. 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 There is no evidence of peripheral sensorimotor polyneuropathy.\par SFEMG; 12/3/10; unremarkable. RNST: no decremental response noted. [FreeTextEntry1] : \par \par

## 2021-04-20 ENCOUNTER — NON-APPOINTMENT (OUTPATIENT)
Age: 71
End: 2021-04-20

## 2021-04-20 LAB
CRP SERPL-MCNC: <3 MG/L
ERYTHROCYTE [SEDIMENTATION RATE] IN BLOOD BY WESTERGREN METHOD: 43 MM/HR

## 2021-04-26 ENCOUNTER — OUTPATIENT (OUTPATIENT)
Dept: OUTPATIENT SERVICES | Facility: HOSPITAL | Age: 71
LOS: 1 days | End: 2021-04-26
Payer: MEDICARE

## 2021-04-26 DIAGNOSIS — Z20.828 CONTACT WITH AND (SUSPECTED) EXPOSURE TO OTHER VIRAL COMMUNICABLE DISEASES: ICD-10-CM

## 2021-04-26 DIAGNOSIS — Z98.890 OTHER SPECIFIED POSTPROCEDURAL STATES: Chronic | ICD-10-CM

## 2021-04-26 LAB — SARS-COV-2 RNA SPEC QL NAA+PROBE: SIGNIFICANT CHANGE UP

## 2021-04-26 PROCEDURE — C9803: CPT

## 2021-04-26 PROCEDURE — U0005: CPT

## 2021-04-26 PROCEDURE — U0003: CPT

## 2021-04-27 DIAGNOSIS — Z20.828 CONTACT WITH AND (SUSPECTED) EXPOSURE TO OTHER VIRAL COMMUNICABLE DISEASES: ICD-10-CM

## 2021-05-10 RX ORDER — MINOXIDIL
POWDER (GRAM) MISCELLANEOUS
Qty: 3 | Refills: 3 | Status: ACTIVE | COMMUNITY
Start: 2021-05-10 | End: 1900-01-01

## 2021-06-04 ENCOUNTER — APPOINTMENT (OUTPATIENT)
Dept: DERMATOLOGY | Facility: CLINIC | Age: 71
End: 2021-06-04

## 2021-10-27 ENCOUNTER — APPOINTMENT (OUTPATIENT)
Dept: NEUROLOGY | Facility: CLINIC | Age: 71
End: 2021-10-27
Payer: MEDICARE

## 2021-10-27 VITALS
HEART RATE: 63 BPM | DIASTOLIC BLOOD PRESSURE: 70 MMHG | TEMPERATURE: 97.8 F | HEIGHT: 63 IN | SYSTOLIC BLOOD PRESSURE: 110 MMHG | BODY MASS INDEX: 22.68 KG/M2 | WEIGHT: 128 LBS

## 2021-10-27 DIAGNOSIS — S16.1XXA STRAIN OF MUSCLE, FASCIA AND TENDON AT NECK LEVEL, INITIAL ENCOUNTER: ICD-10-CM

## 2021-10-27 DIAGNOSIS — R20.8 OTHER DISTURBANCES OF SKIN SENSATION: ICD-10-CM

## 2021-10-27 PROCEDURE — 99213 OFFICE O/P EST LOW 20 MIN: CPT

## 2021-10-27 NOTE — PHYSICAL EXAM
[General Appearance - In No Acute Distress] : in no acute distress [General Appearance - Well-Appearing] : healthy appearing [Affect] : the affect was normal [Mood] : the mood was normal [Person] : oriented to person [Place] : oriented to place [Time] : oriented to time [Short Term Intact] : short term memory intact [Remote Intact] : remote memory intact [Registration Intact] : recent registration memory intact [Span Intact] : the attention span was normal [Concentration Intact] : normal concentrating ability [Naming Objects] : no difficulty naming common objects [Repeating Phrases] : no difficulty repeating a phrase [Fluency] : fluency intact [Comprehension] : comprehension intact [Current Events] : adequate knowledge of current events [Past History] : adequate knowledge of personal past history [Cranial Nerves Optic (II)] : visual acuity intact bilaterally,  visual fields full to confrontation, pupils equal round and reactive to light [Cranial Nerves Oculomotor (III)] : extraocular motion intact [Cranial Nerves Trigeminal (V)] : facial sensation intact symmetrically [Cranial Nerves Facial (VII)] : face symmetrical [Cranial Nerves Vestibulocochlear (VIII)] : hearing was intact bilaterally [Cranial Nerves Glossopharyngeal (IX)] : tongue and palate midline [Cranial Nerves Accessory (XI - Cranial And Spinal)] : head turning and shoulder shrug symmetric [Cranial Nerves Hypoglossal (XII)] : there was no tongue deviation with protrusion [Motor Tone] : muscle tone was normal in all four extremities [Motor Strength] : muscle strength was normal in all four extremities [No Muscle Atrophy] : normal bulk in all four extremities [Sensation Tactile Decrease] : light touch was intact [Abnormal Walk] : normal gait [Balance] : balance was intact [2+] : Ankle jerk left 2+ [PERRL With Normal Accommodation] : pupils were equal in size, round, reactive to light, with normal accommodation [Extraocular Movements] : extraocular movements were intact [Full Visual Field] : full visual field [Outer Ear] : the ears and nose were normal in appearance [Hearing Threshold Finger Rub Not [place]] : hearing was normal [] : the neck was supple [Past-pointing] : there was no past-pointing [Tremor] : no tremor present [Plantar Reflex Right Only] : normal on the right [Plantar Reflex Left Only] : normal on the left [FreeTextEntry5] : fleeting diplopia elicited in the levoversion and levo-elevation [FreeTextEntry1] : ROM full

## 2021-10-27 NOTE — HISTORY OF PRESENT ILLNESS
[FreeTextEntry1] : Patient is here for a follow up visit today, last seen on 3/22/21. Patient c/o occasional soreness in the suboccipital region, denies jaw claudication or visual blurring or diplopia, most of the time it occurs when she is fatigued. It is not associated with eyelid droop or headaches.\par \par At the last visit, patient was recommended to have labs; ESR 43, CRP < 3, I recommended rheumatologist f/u with Dr. Mercedes, she followed up with him, she reports that he did more blood work and noted she has abnormal protein in the blood, she was referred to a hematologist/oncologist, has had repeat blood work done with Dr. 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 Mercedes next week\par  \par

## 2021-10-27 NOTE — REVIEW OF SYSTEMS
[Poor Coordination] : poor coordination [Numbness] : numbness [Tingling] : tingling [Sleep Disturbances] : sleep disturbances [Anxiety] : anxiety [As Noted in HPI] : as noted in HPI [Loss Of Hearing] : hearing loss [Negative] : Heme/Lymph [Tension Headache] : tension-type headaches [FreeTextEntry9] : Neck pain

## 2021-10-27 NOTE — DISCUSSION/SUMMARY
[FreeTextEntry1] : #1) Cephalgia/ cervicobrachialgia - Scalp dysesthesias resolved, last ESR 43\par \par - Have Recommended followup with Dr. 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

## 2021-10-27 NOTE — DATA REVIEWED
[de-identified] : MRI brain: 4/11/10; unremarkable. [de-identified] : 4/17/19:  CT scan head is normal. \par 6/5/14: MRI cervical spine: degenerative changes including bulge and small central herniation with slight cord flattening impingement at C4-5 as well as slight right-sided cord flattening at C5-6 due to right-sided disc osteophyte.\par 5/31/14: MRI lumbar spine: mild disc bulging, L4-5 and L5-S1 present on prior exam 11/1/08. Degenerative disc space narrowing, mildly worsened.\par MRI chest unremarkable.\par Labs: 7/28/14; , vitamin D-50.9, TSH-0.77. CBC, lipid profile normal\par MRI LS spine: 12/8/11; L2-3/3 for bulges, L4-5/L5-S1 bulges with neutral foramina narrowing\par EMG/MCV studies: 10/27/11; abnormal study. 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 There is no evidence of peripheral sensorimotor polyneuropathy.\par SFEMG; 12/3/10; unremarkable. RNST: no decremental response noted. [FreeTextEntry1] : \par \par

## 2021-11-09 ENCOUNTER — APPOINTMENT (OUTPATIENT)
Dept: OBGYN | Facility: CLINIC | Age: 71
End: 2021-11-09
Payer: MEDICARE

## 2021-11-09 VITALS
SYSTOLIC BLOOD PRESSURE: 118 MMHG | WEIGHT: 129 LBS | BODY MASS INDEX: 22.86 KG/M2 | DIASTOLIC BLOOD PRESSURE: 60 MMHG | HEIGHT: 63 IN

## 2021-11-09 DIAGNOSIS — Z01.419 ENCOUNTER FOR GYNECOLOGICAL EXAMINATION (GENERAL) (ROUTINE) W/OUT ABNORMAL FINDINGS: ICD-10-CM

## 2021-11-09 PROCEDURE — G0101: CPT

## 2021-11-09 NOTE — PLAN
[FreeTextEntry1] : 72 YO FEMALE PRESENTS FOR ANNUAL GYN EXAMINATION. SELF BREAST EXAMINATION TAUGHT. MAMMOGRAM ORDERED. EXERCISE, CALCIUM AND VITAMIN D3 SUPPLEMENTATION DISCUSSED. BONE DENSITY STUDY AND INDICATIONS REVIEWED. COLONOSCOPY HISTORY REVIEWED AND DISCUSSED FOLLOWUP

## 2021-11-11 ENCOUNTER — APPOINTMENT (OUTPATIENT)
Dept: DERMATOLOGY | Facility: CLINIC | Age: 71
End: 2021-11-11

## 2021-11-28 ENCOUNTER — EMERGENCY (EMERGENCY)
Facility: HOSPITAL | Age: 71
LOS: 0 days | Discharge: HOME | End: 2021-11-28
Attending: EMERGENCY MEDICINE
Payer: MEDICARE

## 2021-11-28 VITALS
RESPIRATION RATE: 16 BRPM | TEMPERATURE: 98 F | OXYGEN SATURATION: 98 % | SYSTOLIC BLOOD PRESSURE: 152 MMHG | HEART RATE: 80 BPM | DIASTOLIC BLOOD PRESSURE: 53 MMHG

## 2021-11-28 VITALS — WEIGHT: 128.09 LBS | HEIGHT: 62 IN

## 2021-11-28 DIAGNOSIS — Z98.890 OTHER SPECIFIED POSTPROCEDURAL STATES: Chronic | ICD-10-CM
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
## 2021-11-28 DIAGNOSIS — Z86.79 PERSONAL HISTORY OF OTHER DISEASES OF THE CIRCULATORY SYSTEM: ICD-10-CM
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
## 2021-11-28 DIAGNOSIS — Z87.2 PERSONAL HISTORY OF DISEASES OF THE SKIN AND SUBCUTANEOUS TISSUE: ICD-10-CM

## 2021-11-28 DIAGNOSIS — M79.672 PAIN IN LEFT FOOT: ICD-10-CM

## 2021-11-28 DIAGNOSIS — M81.0 AGE-RELATED OSTEOPOROSIS WITHOUT CURRENT PATHOLOGICAL FRACTURE: ICD-10-CM

## 2021-11-28 DIAGNOSIS — Z98.890 OTHER SPECIFIED POSTPROCEDURAL STATES: ICD-10-CM

## 2021-11-28 DIAGNOSIS — E78.5 HYPERLIPIDEMIA, UNSPECIFIED: ICD-10-CM

## 2021-11-28 DIAGNOSIS — E04.9 NONTOXIC GOITER, UNSPECIFIED: ICD-10-CM

## 2021-11-28 DIAGNOSIS — M19.90 UNSPECIFIED OSTEOARTHRITIS, UNSPECIFIED SITE: ICD-10-CM

## 2021-11-28 DIAGNOSIS — K21.9 GASTRO-ESOPHAGEAL REFLUX DISEASE WITHOUT ESOPHAGITIS: ICD-10-CM

## 2021-11-28 DIAGNOSIS — Z91.048 OTHER NONMEDICINAL SUBSTANCE ALLERGY STATUS: ICD-10-CM

## 2021-11-28 DIAGNOSIS — M25.572 PAIN IN LEFT ANKLE AND JOINTS OF LEFT FOOT: ICD-10-CM

## 2021-11-28 DIAGNOSIS — S92.192A OTHER FRACTURE OF LEFT TALUS, INITIAL ENCOUNTER FOR CLOSED FRACTURE: ICD-10-CM

## 2021-11-28 DIAGNOSIS — Z88.0 ALLERGY STATUS TO PENICILLIN: ICD-10-CM

## 2021-11-28 PROCEDURE — 73630 X-RAY EXAM OF FOOT: CPT | Mod: LT

## 2021-11-28 PROCEDURE — 76376 3D RENDER W/INTRP POSTPROCES: CPT

## 2021-11-28 PROCEDURE — 99284 EMERGENCY DEPT VISIT MOD MDM: CPT | Mod: 25

## 2021-11-28 PROCEDURE — 73700 CT LOWER EXTREMITY W/O DYE: CPT | Mod: MA,LT

## 2021-11-28 PROCEDURE — 73630 X-RAY EXAM OF FOOT: CPT | Mod: 26,LT

## 2021-11-28 PROCEDURE — 73700 CT LOWER EXTREMITY W/O DYE: CPT | Mod: 26,LT,MA

## 2021-11-28 PROCEDURE — 73610 X-RAY EXAM OF ANKLE: CPT | Mod: 26,LT

## 2021-11-28 PROCEDURE — 76376 3D RENDER W/INTRP POSTPROCES: CPT | Mod: 26

## 2021-11-28 PROCEDURE — 73610 X-RAY EXAM OF ANKLE: CPT | Mod: LT

## 2021-11-28 PROCEDURE — 99284 EMERGENCY DEPT VISIT MOD MDM: CPT

## 2021-11-28 RX ORDER — IBUPROFEN 200 MG
400 TABLET ORAL ONCE
Refills: 0 | Status: COMPLETED | OUTPATIENT
Start: 2021-11-28 | End: 2021-11-28

## 2021-11-28 RX ORDER — ASPIRIN/CALCIUM CARB/MAGNESIUM 324 MG
1 TABLET ORAL
Qty: 14 | Refills: 0
Start: 2021-11-28 | End: 2021-12-11

## 2021-11-28 NOTE — ED ADULT TRIAGE NOTE - CHIEF COMPLAINT QUOTE
Patient brought into ED via wheel chair co left ankle pain, onset just prior to arrival to the ED. pt states she was walking down three outdoor steps when she fell and rolled her left ankle. patient now with ecchymosis and swelling to the left ankle, unable to walk on the ankle. not on blood thinners, not on asa, no other injuries reported.

## 2021-11-28 NOTE — ED PROVIDER NOTE - PROGRESS NOTE DETAILS
Podiatry resident contacted for consult.  Coming to ED for splinting. Patient splinted by Podiatry.  To follow up with Dr. Christian. No weight bearing.  Need to use crutches.  Recommends aspirin 81mg daily.  Patient already taking mobic and will continue this for pain.  Tylenol to be taken as needed for additional pain coverage.

## 2021-11-28 NOTE — ED PROVIDER NOTE - CARE PROVIDER_API CALL
Nathaniel Christian (DPM)  Surgery Orthopaedic Surgery  [address] [place] [address], [facility] [[email or web address]] [address]  [place] [address]  Phone: [phone]  Fax: [phone]  Follow Up Time:

## 2021-11-28 NOTE — ED PROVIDER NOTE - NSFOLLOWUPINSTRUCTIONS_ED_ALL_ED_FT
Keep foot elevated as much as possible.   Keep splint in place at all times.   NO WEIGHT BEARING ON AFFECTED LEG. USE CRUTCHES.   See podiatrist within 1 week.   Referral given.    Take aspirin 81mg daily.     Continue mobic for pain.    Take tylenol 650mg every 4-6 hours for additional pain coverage.                    FOOT FRACTURE IN ADULTS - AfterCare(R) Instructions(ER/ED)           Foot Fracture in Adults    WHAT YOU NEED TO KNOW:    A foot fracture is a break in a bone in your foot.    Foot Anatomy         DISCHARGE INSTRUCTIONS:    Call your local emergency number (911 in the [place]) if:   •You suddenly feel lightheaded and short of breath.      •You have chest pain when you take a deep breath or cough.      •You cough up blood.      Return to the emergency department if:   •The pain in your injured foot gets worse even after you rest and take pain medicine.      •The skin or toes of your foot become numb, swollen, cold, white, or blue.      •You have more pain or swelling than you did before a cast was put on.      •Your leg feels warm, tender, and painful. It may look swollen and red.      Call your doctor if:   •You have a fever.      •You have new sores around your boot, cast, or splint.      •You have new or worsening trouble moving your foot.      •You notice a foul smell coming from under your cast.      •Your boot, cast, or splint gets damaged.      •You have questions or concerns about your condition or care.      Medicines: You may need any of the following:   •Antibiotics: This medicine is given to help treat or prevent an infection caused by bacteria.       •NSAIDs, such as ibuprofen, help decrease swelling, pain, and fever. NSAIDs can cause stomach bleeding or kidney problems in certain people. If you take blood thinner medicine, always ask your healthcare provider if NSAIDs are safe for you. Always read the medicine label and follow directions.      •Prescription pain medicine may be given. Ask your healthcare provider how to take this medicine safely. Some prescription pain medicines contain acetaminophen. Do not take other medicines that contain acetaminophen without talking to your healthcare provider. Too much acetaminophen may cause liver damage. Prescription pain medicine may cause constipation. Ask your healthcare provider how to prevent or treat constipation.       •Take your medicine as directed. Contact your healthcare provider if you think your medicine is not helping or if you have side effects. Tell him or her if you are allergic to any medicine. Keep a list of the medicines, vitamins, and herbs you take. Include the amounts, and when and why you take them. Bring the list or the pill bottles to follow-up visits. Carry your medicine list with you in case of an emergency.      Self-care:   •Rest your foot and avoid activities that cause pain.      •Apply ice to decrease swelling and pain, and to prevent tissue damage. Use an ice pack, or put crushed ice in a plastic bag. Cover it with a towel before you apply it. Use ice for 15 to 20 minutes every hour or as directed.      •Elevate your foot above the level of your heart as often as you can. This will help decrease swelling and pain. Prop your foot on pillows or blankets to keep it elevated comfortably.  Elevate Leg           •Physical therapy may be needed when your foot has healed. A physical therapist can teach you exercises to help improve movement and strength, and to decrease pain.      Cast or splint care:   •Ask when it is okay to take a bath or shower. Do not let your cast or splint get wet. Before you bathe, cover the cast or splint with a plastic bag. Tape the bag to your skin above the splint to seal out water. Keep your foot out of the water in case the bag leaks.      •Check the skin around your splint daily for any redness or open areas.      •Do not use a sharp or pointed object to scratch your skin under the splint.      •Do not remove your splint unless your healthcare provider or orthopedic surgeon says it is okay.      Assistive devices: You may be given a hard-soled shoe to wear while your foot is healing. You also may need to use crutches to help you walk while your foot heals. It is important to use your crutches correctly. Ask for more information about how to use crutches.    Follow up with your doctor or bone specialist as directed: You may need to return to have your splint or stitches removed. You may also need to return for tests to make sure your foot is healing. Write down your questions so you remember to ask them during your visits.       © Copyright [employer] 2021           back to top                          © Copyright [employer] 2021

## 2021-11-28 NOTE — ED PROVIDER NOTE - OBJECTIVE STATEMENT
Pt. is a 70 yo F with hx of hyperlipidemia, mitral valve prolapse, arthritis, osteoporosis, hx of metatarsal fractures in left foot, hx of bunionectomy left great toe 2013 presents with left foot and ankle pain s/p fall off a step at home. Patient states injury occurred about an hour ago.  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  She was wearing slip on sneakers but states left foot turned over and then she had pain , swelling and trouble walking.  Patient took tylenol prior to the fall for her arthritis in her hands.  No other meds taken prior to arrival.

## 2021-11-28 NOTE — ED ADULT NURSE NOTE - NSICDXPASTSURGICALHX_GEN_ALL_CORE_FT
PAST SURGICAL HISTORY:  H/O carpal tunnel repair     H/O hernia repair     History of bunionectomy of left great toe 2013    S/P bunionectomy right foot 1993    S/P right knee arthroscopy

## 2021-11-28 NOTE — ED PROVIDER NOTE - MUSCULOSKELETAL, MLM
Spine appears normal, range of motion is not limited, Tenderness and hematoma overlying dorsum of left proximal foot. No ankle or tib/fib or knee tenderness.

## 2021-11-28 NOTE — ED ADULT NURSE NOTE - NSFALLRSKPASTHIST_ED_ALL_ED
Observation goals PRIOR TO DISCHARGE     Comments: List all goals to be met before discharge home:   - Serial troponins and stress test complete. -partially met  - Seen and cleared by consultant if applicable -not met  - Adequate pain control on oral analgesia -met  - Vital signs normal or at patient baseline -partially met  - Safe disposition plan has been identified -not met  - Nurse to notify provider when observation goals have been met and patient is ready for discharge.      yes

## 2021-11-28 NOTE — ED ADULT NURSE NOTE - OBJECTIVE STATEMENT
Patient walked in with  complaining of left ankle pain. Patient states that she missed a step while trying to lock her car. Patient left foot noted to be swollen.

## 2021-11-28 NOTE — ED PROVIDER NOTE - CLINICAL SUMMARY MEDICAL DECISION MAKING FREE TEXT BOX
Foot fracture s/p fall.  Podiatry consulted.  Pain control , splinting and outpatient referral to podiatry.

## 2021-11-28 NOTE — ED ADULT NURSE NOTE - NSICDXPASTMEDICALHX_GEN_ALL_CORE_FT
PAST MEDICAL HISTORY:  Alopecia     GERD (gastroesophageal reflux disease)     Goiter     Hyperlipemia     Mitral valve prolapse

## 2022-11-21 RX ORDER — HYDROCORTISONE 2.5% 25 MG/G
2.5 CREAM TOPICAL
Qty: 1 | Refills: 0 | Status: ACTIVE | COMMUNITY
Start: 2020-04-27 | End: 1900-01-01

## 2023-03-13 ENCOUNTER — APPOINTMENT (OUTPATIENT)
Dept: RHEUMATOLOGY | Facility: CLINIC | Age: 73
End: 2023-03-13
Payer: MEDICARE

## 2023-03-13 VITALS
HEIGHT: 63 IN | SYSTOLIC BLOOD PRESSURE: 110 MMHG | OXYGEN SATURATION: 99 % | TEMPERATURE: 97 F | WEIGHT: 126 LBS | HEART RATE: 61 BPM | BODY MASS INDEX: 22.32 KG/M2 | DIASTOLIC BLOOD PRESSURE: 60 MMHG

## 2023-03-13 PROCEDURE — 99204 OFFICE O/P NEW MOD 45 MIN: CPT

## 2023-05-10 NOTE — PHYSICAL EXAM
[General Appearance - Alert] : alert [General Appearance - In No Acute Distress] : in no acute distress [General Appearance - Well Nourished] : well nourished [Oriented To Time, Place, And Person] : oriented to person, place, and time [Impaired Insight] : insight and judgment were intact [Affect] : the affect was normal [Sclera] : the sclera and conjunctiva were normal [PERRL With Normal Accommodation] : pupils were equal in size, round, and reactive to light [Extraocular Movements] : extraocular movements were intact [Oropharynx] : the oropharynx was normal [Neck Appearance] : the appearance of the neck was normal [Auscultation Breath Sounds / Voice Sounds] : lungs were clear to auscultation bilaterally [Heart Rate And Rhythm] : heart rate was normal and rhythm regular [Heart Sounds] : normal S1 and S2 [Murmurs] : no murmurs [Edema] : there was no peripheral edema [Bowel Sounds] : normal bowel sounds [Abdomen Soft] : soft [Abdomen Tenderness] : non-tender [Abdomen Mass (___ Cm)] : no abdominal mass palpated [No Spinal Tenderness] : no spinal tenderness [Abnormal Walk] : normal gait [Nail Clubbing] : no clubbing  or cyanosis of the fingernails [Musculoskeletal - Swelling] : no joint swelling seen [Motor Tone] : muscle strength and tone were normal [] : no rash [No Focal Deficits] : no focal deficits [FreeTextEntry1] : OA changes in b/l hands, no synovitis or effusions, fist ROM with some limitation, remainder of ROM intact

## 2023-05-10 NOTE — ASSESSMENT
[FreeTextEntry1] : THALIA MALONE is a 72 year old woman with below --\par \par # b/l hand pain and difficulty with use, OA changes on exam, no overt inflammatory sx \par - will obtain/review prior rheum chart\par - c/w prn mobic sparingly with food \par - can also try topical Voltaren gel prn pain up to TID to affected peripheral joints for OA -related pain \par - OT referral  \par \par # Prior osteopenia, due for repeat DEXA, recent traumatic foot fx\par - repeat DEXA now\par - reviewed recommendations for dietary Ca 600mg BID with food, supplemental Vit D 1000 IU daily\par - reviewed recommendations for weight bearing exercise for goal of 30min 3-4x/week to maintain BMD - modalities reviewed with patient \par \par RTC 4 months

## 2023-11-27 ENCOUNTER — APPOINTMENT (OUTPATIENT)
Dept: RHEUMATOLOGY | Facility: CLINIC | Age: 73
End: 2023-11-27
Payer: MEDICARE

## 2023-11-27 VITALS
HEART RATE: 70 BPM | SYSTOLIC BLOOD PRESSURE: 110 MMHG | DIASTOLIC BLOOD PRESSURE: 60 MMHG | BODY MASS INDEX: 22.5 KG/M2 | HEIGHT: 63 IN | WEIGHT: 127 LBS | TEMPERATURE: 96.6 F | OXYGEN SATURATION: 99 %

## 2023-11-27 DIAGNOSIS — M19.041 PRIMARY OSTEOARTHRITIS, RIGHT HAND: ICD-10-CM

## 2023-11-27 DIAGNOSIS — M85.89 OTHER SPECIFIED DISORDERS OF BONE DENSITY AND STRUCTURE, MULTIPLE SITES: ICD-10-CM

## 2023-11-27 DIAGNOSIS — M79.642 PAIN IN RIGHT HAND: ICD-10-CM

## 2023-11-27 DIAGNOSIS — M79.641 PAIN IN RIGHT HAND: ICD-10-CM

## 2023-11-27 DIAGNOSIS — M19.042 PRIMARY OSTEOARTHRITIS, RIGHT HAND: ICD-10-CM

## 2023-11-27 PROCEDURE — 99214 OFFICE O/P EST MOD 30 MIN: CPT

## 2023-11-27 RX ORDER — MELOXICAM 7.5 MG/1
7.5 TABLET ORAL
Qty: 60 | Refills: 3 | Status: DISCONTINUED | COMMUNITY
Start: 2023-03-13 | End: 2023-11-27

## 2023-11-27 RX ORDER — MELOXICAM 7.5 MG/1
7.5 TABLET ORAL
Refills: 0 | Status: DISCONTINUED | COMMUNITY
End: 2023-11-27

## 2023-11-27 RX ORDER — MULTIVIT-MIN/IRON/FOLIC ACID/K 18-600-40
CAPSULE ORAL
Refills: 0 | Status: ACTIVE | COMMUNITY

## 2024-01-02 ENCOUNTER — APPOINTMENT (OUTPATIENT)
Dept: OBGYN | Facility: CLINIC | Age: 74
End: 2024-01-02

## 2024-01-31 ENCOUNTER — APPOINTMENT (OUTPATIENT)
Dept: NEUROLOGY | Facility: CLINIC | Age: 74
End: 2024-01-31
Payer: MEDICARE

## 2024-01-31 VITALS
HEART RATE: 73 BPM | DIASTOLIC BLOOD PRESSURE: 72 MMHG | TEMPERATURE: 97.8 F | BODY MASS INDEX: 22.68 KG/M2 | SYSTOLIC BLOOD PRESSURE: 106 MMHG | HEIGHT: 63 IN | WEIGHT: 128 LBS

## 2024-01-31 DIAGNOSIS — R55 SYNCOPE AND COLLAPSE: ICD-10-CM

## 2024-01-31 PROCEDURE — 99213 OFFICE O/P EST LOW 20 MIN: CPT

## 2024-01-31 RX ORDER — METOPROLOL SUCCINATE 25 MG/1
25 TABLET, EXTENDED RELEASE ORAL DAILY
Refills: 0 | Status: ACTIVE | COMMUNITY

## 2024-01-31 RX ORDER — SERTRALINE HYDROCHLORIDE 50 MG/1
50 TABLET, FILM COATED ORAL DAILY
Refills: 0 | Status: ACTIVE | COMMUNITY

## 2024-01-31 RX ORDER — ATORVASTATIN CALCIUM 40 MG/1
40 TABLET, FILM COATED ORAL DAILY
Refills: 0 | Status: ACTIVE | COMMUNITY

## 2024-01-31 RX ORDER — DOXYCYCLINE HYCLATE 100 MG/1
100 CAPSULE ORAL TWICE DAILY
Qty: 60 | Refills: 2 | Status: DISCONTINUED | COMMUNITY
Start: 2021-01-08 | End: 2024-01-31

## 2024-01-31 RX ORDER — SERTRALINE HYDROCHLORIDE 25 MG/1
TABLET, FILM COATED ORAL
Refills: 0 | Status: DISCONTINUED | COMMUNITY
End: 2024-01-31

## 2024-01-31 NOTE — REASON FOR VISIT
[Follow-Up: _____] : a [unfilled] follow-up visit [FreeTextEntry1] : For evaluation of brief episodes of almost blacking out while driving

## 2024-01-31 NOTE — REVIEW OF SYSTEMS
[Poor Coordination] : poor coordination [Tension Headache] : tension-type headaches [Sleep Disturbances] : sleep disturbances [Anxiety] : anxiety [As Noted in HPI] : as noted in HPI [Loss Of Hearing] : hearing loss [Negative] : Heme/Lymph [Fainting] : fainting [FreeTextEntry9] : Neck pain

## 2024-01-31 NOTE — PHYSICAL EXAM
[General Appearance - In No Acute Distress] : in no acute distress [General Appearance - Well-Appearing] : healthy appearing [Affect] : the affect was normal [Mood] : the mood was normal [Person] : oriented to person [Place] : oriented to place [Time] : oriented to time [Short Term Intact] : short term memory intact [Remote Intact] : remote memory intact [Registration Intact] : recent registration memory intact [Span Intact] : the attention span was normal [Concentration Intact] : normal concentrating ability [Naming Objects] : no difficulty naming common objects [Repeating Phrases] : no difficulty repeating a phrase [Fluency] : fluency intact [Comprehension] : comprehension intact [Current Events] : adequate knowledge of current events [Past History] : adequate knowledge of personal past history [Cranial Nerves Optic (II)] : visual acuity intact bilaterally,  visual fields full to confrontation, pupils equal round and reactive to light [Cranial Nerves Oculomotor (III)] : extraocular motion intact [Cranial Nerves Trigeminal (V)] : facial sensation intact symmetrically [Cranial Nerves Facial (VII)] : face symmetrical [Cranial Nerves Vestibulocochlear (VIII)] : hearing was intact bilaterally [Cranial Nerves Glossopharyngeal (IX)] : tongue and palate midline [Cranial Nerves Accessory (XI - Cranial And Spinal)] : head turning and shoulder shrug symmetric [Cranial Nerves Hypoglossal (XII)] : there was no tongue deviation with protrusion [Motor Tone] : muscle tone was normal in all four extremities [Motor Strength] : muscle strength was normal in all four extremities [No Muscle Atrophy] : normal bulk in all four extremities [Sensation Tactile Decrease] : light touch was intact [Abnormal Walk] : normal gait [Balance] : balance was intact [2+] : Ankle jerk left 2+ [Past-pointing] : there was no past-pointing [Tremor] : no tremor present [Plantar Reflex Right Only] : normal on the right [Plantar Reflex Left Only] : normal on the left [FreeTextEntry5] : No diplopia elicited in the levoversion and levo-elevation [FreeTextEntry1] : ROM full

## 2024-01-31 NOTE — HISTORY OF PRESENT ILLNESS
[FreeTextEntry1] : Patient is here for a follow up visit today, last seen on 10/27/21. Patient reports that about 5 6 months ago, it was a hot summer day, she had been running around, was driving to  some bagels, all of a sudden while making a right turn her vision blackened, it returned back within seconds and she was back to her normal self, she resumed driving finished her chores, came back home ate some breakfast and felt better.  Patient does admit that she had not eaten anything since she had woken up in the morning till the time this episode occurred around 11 AM.  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  Patient does admit she has been under a lot of stress, she is taking care of her ailing mother, is also involved in her daughters  twins.  Patient has been following up with rheumatologist Dr. Rojas for arthritis, Of note her last ESR was 44, she reports she discussed it with her then rheumatologist Dr. Mercedes

## 2024-01-31 NOTE — DISCUSSION/SUMMARY
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 diplopia does not interfere the patient's functioning;   - followup with ophthalmologist.  #3) Occasional gait instability, possibly vestibular etiology  - If symptoms persist, VT

## 2024-01-31 NOTE — DATA REVIEWED
[de-identified] : MRI brain: 4/11/10; unremarkable. [de-identified] : 4/17/19:  CT scan head is normal. \par  6/5/14: MRI cervical spine: degenerative changes including bulge and small central herniation with slight cord flattening impingement at C4-5 as well as slight right-sided cord flattening at C5-6 due to right-sided disc osteophyte.\par  5/31/14: MRI lumbar spine: mild disc bulging, L4-5 and L5-S1 present on prior exam 11/1/08. Degenerative disc space narrowing, mildly worsened.\par  MRI chest unremarkable.\par  Labs: 7/28/14; , vitamin D-50.9, TSH-0.77. CBC, lipid profile normal\par  MRI LS spine: 12/8/11; L2-3/3 for bulges, L4-5/L5-S1 bulges with neutral foramina narrowing\par  EMG/MCV studies: 10/27/11; abnormal study. 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 There is no evidence of peripheral sensorimotor polyneuropathy.\par  SFEMG; 12/3/10; unremarkable. RNST: no decremental response noted. [FreeTextEntry1] : \par  \par

## 2024-02-15 ENCOUNTER — APPOINTMENT (OUTPATIENT)
Dept: NEUROLOGY | Facility: CLINIC | Age: 74
End: 2024-02-15

## 2024-02-29 NOTE — HISTORY OF PRESENT ILLNESS
[FreeTextEntry1] : THALIA MALONE is a 72 year old woman who presents with sx below, previously following with Dr Abebe - [facility] -- was on Mobic with him for joint pain below which has been helpful and without SE, not sure what her dx was --   + b/l hand pain and bony nodularity over DIPs and PIPs, difficulty with some activities   Inflammatory arthritis ROS negative for symmetrical peripheral joint synovitis, prolonged AM stiffness, enthesitis, dactylitis, psoriasis/ rashes, eye inflammation, inflammatory low back pain, IBD.   SLE ROS negative for focal alopecia, sicca, salivary gland swelling, oral ulcers, malar rash, photosensitivity, SOB, chest pain, serositis, abd pain, dysuria, hematuria, rash, joint AM stiffness/synovitis, hematologic abnormalities, Raynauds.   Negative FH for autoimmune d/o, paternal aunts with similar OA changes to hands   + prior meniscal sx on R knee  + traumatic L foot fracture, healed without needing sx, past DEXA has osteopenia  + stable frontal fibrosing alopecia   --------- 11/27/23 -- Ongoing hand pain, but ADLs intact, never got to go to OT 2/2 caring for mother which she still is. Using prn tylenol and Advil, latter helps more. Hasn't been taking Mobic.

## 2024-02-29 NOTE — ASSESSMENT
[FreeTextEntry1] : THALIA MALONE is a 73 year old woman with below --  # b/l hand pain and difficulty with use, OA changes on exam, no overt inflammatory sx - remains unchanged from initial eval  - prior rheum chart reviewed - OA dx there as well  - c/w prn Advil sparingly with food as not interested in rx NSAIDs  - advised to try topical Voltaren gel prn pain up to TID to affected peripheral joints for OA -related pain - will defer OT for now but advised pt to call if wants to begin   # Prior osteopenia, due for repeat DEXA, recent traumatic foot fx - repeat DEXA due - order reissued  - c/w dietary Ca 600mg BID with food, supplemental Vit D 1000 IU daily - increase weight bearing exercise for goal of 30min 3-4x/week to maintain BMD - modalities reviewed with patient  RTC 6 months

## 2024-02-29 NOTE — PHYSICAL EXAM
[FreeTextEntry1] : OA changes in b/l hands, no synovitis or effusions, fist ROM with some limitation, remainder of ROM intact

## 2024-04-08 ENCOUNTER — APPOINTMENT (OUTPATIENT)
Dept: NEUROLOGY | Facility: CLINIC | Age: 74
End: 2024-04-08

## 2024-05-13 ENCOUNTER — APPOINTMENT (OUTPATIENT)
Dept: RHEUMATOLOGY | Facility: CLINIC | Age: 74
End: 2024-05-13

## 2024-05-15 ENCOUNTER — APPOINTMENT (OUTPATIENT)
Dept: NEUROLOGY | Facility: CLINIC | Age: 74
End: 2024-05-15
Payer: MEDICARE

## 2024-05-15 PROCEDURE — 95816 EEG AWAKE AND DROWSY: CPT

## 2024-05-17 ENCOUNTER — NON-APPOINTMENT (OUTPATIENT)
Age: 74
End: 2024-05-17

## 2024-05-20 ENCOUNTER — APPOINTMENT (OUTPATIENT)
Dept: RHEUMATOLOGY | Facility: CLINIC | Age: 74
End: 2024-05-20

## 2024-07-02 ENCOUNTER — APPOINTMENT (OUTPATIENT)
Dept: OBGYN | Facility: CLINIC | Age: 74
End: 2024-07-02

## 2024-07-29 ENCOUNTER — APPOINTMENT (OUTPATIENT)
Dept: RHEUMATOLOGY | Facility: CLINIC | Age: 74
End: 2024-07-29

## 2024-07-29 VITALS
HEART RATE: 75 BPM | TEMPERATURE: 97.6 F | HEIGHT: 63 IN | BODY MASS INDEX: 22.68 KG/M2 | DIASTOLIC BLOOD PRESSURE: 66 MMHG | WEIGHT: 128 LBS | OXYGEN SATURATION: 98 % | SYSTOLIC BLOOD PRESSURE: 104 MMHG

## 2024-07-29 DIAGNOSIS — M85.89 OTHER SPECIFIED DISORDERS OF BONE DENSITY AND STRUCTURE, MULTIPLE SITES: ICD-10-CM

## 2024-07-29 DIAGNOSIS — M19.042 PRIMARY OSTEOARTHRITIS, RIGHT HAND: ICD-10-CM

## 2024-07-29 DIAGNOSIS — M19.041 PRIMARY OSTEOARTHRITIS, RIGHT HAND: ICD-10-CM

## 2024-07-29 DIAGNOSIS — M54.31 SCIATICA, RIGHT SIDE: ICD-10-CM

## 2024-07-29 DIAGNOSIS — M79.674 PAIN IN RIGHT TOE(S): ICD-10-CM

## 2024-07-29 DIAGNOSIS — M65.4 RADIAL STYLOID TENOSYNOVITIS [DE QUERVAIN]: ICD-10-CM

## 2024-07-29 PROCEDURE — G2211 COMPLEX E/M VISIT ADD ON: CPT

## 2024-07-29 PROCEDURE — 99214 OFFICE O/P EST MOD 30 MIN: CPT

## 2024-07-29 RX ORDER — SERTRALINE HYDROCHLORIDE 50 MG/1
50 TABLET, FILM COATED ORAL
Refills: 0 | Status: ACTIVE | COMMUNITY

## 2024-07-29 NOTE — PHYSICAL EXAM
[General Appearance - Alert] : alert [General Appearance - In No Acute Distress] : in no acute distress [General Appearance - Well Nourished] : well nourished [Sclera] : the sclera and conjunctiva were normal [Neck Appearance] : the appearance of the neck was normal [Edema] : there was no peripheral edema [No Spinal Tenderness] : no spinal tenderness [Abnormal Walk] : normal gait [Nail Clubbing] : no clubbing  or cyanosis of the fingernails [Musculoskeletal - Swelling] : no joint swelling seen [Motor Tone] : muscle strength and tone were normal [] : no rash [No Focal Deficits] : no focal deficits [Oriented To Time, Place, And Person] : oriented to person, place, and time [Impaired Insight] : insight and judgment were intact [Affect] : the affect was normal [FreeTextEntry1] : OA changes in b/l hands, no synovitis or effusions, fist ROM with some limitation, remainder of ROM intact

## 2024-07-29 NOTE — ASSESSMENT
[FreeTextEntry1] : THALIA MALONE is a 73 year old woman with below --  # b/l hand pain and difficulty with use, OA changes on exam, no overt inflammatory sx - remains unchanged from initial eval  - prior rheum chart reviewed - OA dx there as well  - c/w prn Advil sparingly with food as not interested in rx NSAIDs  - advised to try topical Voltaren gel prn pain up to TID to affected peripheral joints for OA -related pain - will defer OT for now but advised pt to call if wants to begin   # Prior osteopenia, due for repeat DEXA, recent traumatic foot fx # + SPEP  - repeat DEXA due - order reissued  - c/w dietary Ca 600mg BID with food, supplemental Vit D 1000 IU daily - increase weight bearing exercise for goal of 30min 3-4x/week to maintain BMD - modalities reviewed with patient  RTC 6 months

## 2024-09-03 ENCOUNTER — APPOINTMENT (OUTPATIENT)
Dept: OBGYN | Facility: CLINIC | Age: 74
End: 2024-09-03
Payer: MEDICARE

## 2024-09-03 VITALS
DIASTOLIC BLOOD PRESSURE: 74 MMHG | WEIGHT: 128 LBS | HEIGHT: 63 IN | BODY MASS INDEX: 22.68 KG/M2 | SYSTOLIC BLOOD PRESSURE: 110 MMHG

## 2024-09-03 DIAGNOSIS — Z01.419 ENCOUNTER FOR GYNECOLOGICAL EXAMINATION (GENERAL) (ROUTINE) W/OUT ABNORMAL FINDINGS: ICD-10-CM

## 2024-09-03 LAB
CARD LOT #: NORMAL
CARD LOT EXP DATE: NORMAL
DATE COLLECTED: NORMAL
DATE COLLECTED: NORMAL
DEVELOPER LOT #: NORMAL
DEVELOPER LOT EXP DATE: NORMAL
HEMOCCULT 2: NEGATIVE
HEMOCCULT SP1 STL QL: NEGATIVE
QUALITY CONTROL: YES
QUALITY CONTROL: YES

## 2024-09-03 PROCEDURE — 82270 OCCULT BLOOD FECES: CPT

## 2024-09-03 PROCEDURE — G0101: CPT

## 2024-09-03 NOTE — PHYSICAL EXAM
[Chaperone Declined] : Patient declined chaperone [Appropriately responsive] : appropriately responsive [Alert] : alert [No Acute Distress] : no acute distress [Soft] : soft [Non-tender] : non-tender [Non-distended] : non-distended [No HSM] : No HSM [No Lesions] : no lesions [No Mass] : no mass [Oriented x3] : oriented x3 [Examination Of The Breasts] : a normal appearance [No Masses] : no breast masses were palpable [Labia Majora] : normal [Labia Minora] : normal [Atrophy] : atrophy [Uterine Adnexae] : normal [Normal rectal exam] : was normal [Normal Brown Stool] : was normal and brown [Normal] : was normal [None] : there was no rectal mass  [Occult Blood Positive] : was negative for occult blood analysis [Internal Hemorrhoid] : no internal hemorrhoids were present [External Hemorrhoid] : no external hemorrhoids were present [Skin Tags] : no residual hemorrhoidal skin tags

## 2024-09-04 LAB
APPEARANCE: CLEAR
BILIRUBIN URINE: NEGATIVE
BLOOD URINE: NEGATIVE
COLOR: YELLOW
CYTOLOGY CVX/VAG DOC THIN PREP: ABNORMAL
GLUCOSE QUALITATIVE U: NEGATIVE MG/DL
KETONES URINE: NEGATIVE MG/DL
LEUKOCYTE ESTERASE URINE: NEGATIVE
NITRITE URINE: NEGATIVE
PH URINE: 6
PROTEIN URINE: NEGATIVE MG/DL
SPECIFIC GRAVITY URINE: 1.01
UROBILINOGEN URINE: 0.2 MG/DL

## 2025-01-27 ENCOUNTER — APPOINTMENT (OUTPATIENT)
Dept: RHEUMATOLOGY | Facility: CLINIC | Age: 75
End: 2025-01-27
Payer: MEDICARE

## 2025-02-03 ENCOUNTER — APPOINTMENT (OUTPATIENT)
Dept: RHEUMATOLOGY | Facility: CLINIC | Age: 75
End: 2025-02-03
Payer: MEDICARE

## 2025-02-03 VITALS
OXYGEN SATURATION: 98 % | TEMPERATURE: 97.6 F | DIASTOLIC BLOOD PRESSURE: 68 MMHG | SYSTOLIC BLOOD PRESSURE: 106 MMHG | HEART RATE: 78 BPM | WEIGHT: 125 LBS | HEIGHT: 63 IN | BODY MASS INDEX: 22.15 KG/M2

## 2025-02-03 DIAGNOSIS — M54.31 SCIATICA, RIGHT SIDE: ICD-10-CM

## 2025-02-03 DIAGNOSIS — M19.042 PRIMARY OSTEOARTHRITIS, RIGHT HAND: ICD-10-CM

## 2025-02-03 DIAGNOSIS — M65.4 RADIAL STYLOID TENOSYNOVITIS [DE QUERVAIN]: ICD-10-CM

## 2025-02-03 DIAGNOSIS — M85.89 OTHER SPECIFIED DISORDERS OF BONE DENSITY AND STRUCTURE, MULTIPLE SITES: ICD-10-CM

## 2025-02-03 DIAGNOSIS — M47.26 OTHER SPONDYLOSIS WITH RADICULOPATHY, LUMBAR REGION: ICD-10-CM

## 2025-02-03 DIAGNOSIS — M19.041 PRIMARY OSTEOARTHRITIS, RIGHT HAND: ICD-10-CM

## 2025-02-03 PROCEDURE — 99214 OFFICE O/P EST MOD 30 MIN: CPT

## 2025-02-03 PROCEDURE — G2211 COMPLEX E/M VISIT ADD ON: CPT

## 2025-03-03 NOTE — ASU PATIENT PROFILE, ADULT - DOMESTIC TRAVEL HIGH RISK QUESTION
UPPER GASTROINTESTINAL ENDOSCOPY N/A 1/20/2025    ESOPHAGOGASTRODUODENOSCOPY BIOPSY performed by Kaley Gonzáles MD at [facility] ENDOSCOPY     Social history:  reports that she quit smoking about 30 years ago. Her smoking use included cigarettes. She started smoking about 65 years ago. She has a 35 pack-year smoking history. She has been exposed to tobacco smoke. She has never used smokeless tobacco. She reports that she does not drink alcohol and does not use drugs.  Family history:    Family History   Problem Relation Age of Onset    Asthma Neg Hx     Cancer Neg Hx     Diabetes Neg Hx     Emphysema Neg Hx     Sleep Apnea Neg Hx     Hypertension Neg Hx     Heart Failure Neg Hx        Home medications:   Prior to Admission Medications   Prescriptions Last Dose Informant Patient Reported? Taking?   DULoxetine (CYMBALTA) 30 MG extended release capsule   Yes No   Sig: Take 1 capsule by mouth 2 times daily   busPIRone (BUSPAR) 15 MG tablet   Yes No   Sig: Take 15 mg by mouth 2 times daily   diclofenac sodium (VOLTAREN) 1 % GEL   No No   Sig: Apply 2 g topically 3 times daily   furosemide (LASIX) 40 MG tablet   Yes No   Sig: Take 1 tablet by mouth daily   isosorbide mononitrate (IMDUR) 120 MG extended release tablet   Yes No   Sig: Take 1 tablet by mouth daily   lidocaine 4 % external patch   No No   Sig: Place 1 patch onto the skin daily   metoprolol succinate (TOPROL XL) 25 MG extended release tablet   No No   Sig: Take 3 tablets by mouth daily   nitroGLYCERIN (NITROSTAT) 0.4 MG SL tablet   No No   Sig: Place 1 tablet under the tongue every 5 minutes as needed for Chest pain up to max of 3 total doses. If no relief after 1 dose, call 911.   pantoprazole (PROTONIX) 40 MG tablet   No No   Sig: Take 1 tablet by mouth 2 times daily   potassium chloride (KLOR-CON) 10 MEQ extended release tablet   Yes No   Sig: Take 1 tablet by mouth daily   simvastatin (ZOCOR) 20 MG tablet   No No   Sig: Take 1 tablet by mouth nightly     No

## 2025-08-04 ENCOUNTER — NON-APPOINTMENT (OUTPATIENT)
Age: 75
End: 2025-08-04

## 2025-08-04 DIAGNOSIS — Z12.31 ENCOUNTER FOR SCREENING MAMMOGRAM FOR MALIGNANT NEOPLASM OF BREAST: ICD-10-CM

## 2025-08-11 ENCOUNTER — APPOINTMENT (OUTPATIENT)
Dept: RHEUMATOLOGY | Facility: CLINIC | Age: 75
End: 2025-08-11